# Patient Record
Sex: FEMALE | Race: WHITE
[De-identification: names, ages, dates, MRNs, and addresses within clinical notes are randomized per-mention and may not be internally consistent; named-entity substitution may affect disease eponyms.]

---

## 2018-02-02 ENCOUNTER — HOSPITAL ENCOUNTER (EMERGENCY)
Dept: HOSPITAL 74 - FER | Age: 43
Discharge: HOME | End: 2018-02-02
Payer: COMMERCIAL

## 2018-02-02 VITALS — SYSTOLIC BLOOD PRESSURE: 111 MMHG | TEMPERATURE: 98.6 F | HEART RATE: 60 BPM | DIASTOLIC BLOOD PRESSURE: 75 MMHG

## 2018-02-02 VITALS — BODY MASS INDEX: 30.9 KG/M2

## 2018-02-02 DIAGNOSIS — J40: Primary | ICD-10-CM

## 2018-02-02 PROCEDURE — 3E0F7GC INTRODUCTION OF OTHER THERAPEUTIC SUBSTANCE INTO RESPIRATORY TRACT, VIA NATURAL OR ARTIFICIAL OPENING: ICD-10-PCS

## 2018-02-02 NOTE — PDOC
History of Present Illness





- General


History Source: Patient


Exam Limitations: No Limitations





- History of Present Illness


Initial Comments: 





02/02/18 16:51


The patient is a 42 year old female with a past significant medical history of 

irregular menses (on oral contraceptives, discontinued hormones 6 months ago), 

who presents to the emergency department with frequent coughing and chest 

tightness for several days. She reports a recent diagnosis of asthma from an 

urgent care center. She denies any laboratory or pulmonary function evaluations 

at the time during her urgent care visit. She reports anxiety symptoms in the 

past. She denies any diagnosis for anxiety. 





She denies any fever, chills, shortness of breath. She denies any headache, 

nausea, vomiting, or diarrhea. 


She denies any alcohol use or smoking.


Family history and social history is non contributory. 





<Sharri Cantu - Last Filed: 02/02/18 16:51>





<Osmin Price - Last Filed: 02/02/18 18:06>





- General


Chief Complaint: Asthma


Stated Complaint: COUGH,SOB


Time Seen by Provider: 02/02/18 14:53





Past History





<Sharri Cantu - Last Filed: 02/02/18 16:51>





- Past Medical History


Anemia: No


Asthma: No


Cancer: No


Cardiac Disorders: No


CVA: No


COPD: No


CHF: No


Dementia: No


Diabetes: No


GI Disorders: No


 Disorders: No


HTN: No


Hypercholesterolemia: No


Liver Disease: No


Seizures: No


Thyroid Disease: No





- Suicide/Smoking/Psychosocial Hx


Smoking Status: No


Smoking History: Never smoked


Have you smoked in the past 12 months: No


Number of Cigarettes Smoked Daily: 0


Information on smoking cessation initiated: No


Hx Alcohol Use: Yes (occas)


Drug/Substance Use Hx: No


Substance Use Type: None


Hx Substance Use Treatment: No





<Osmin Price - Last Filed: 02/02/18 18:06>





- Past Medical History


Allergies/Adverse Reactions: 


 Allergies











Allergy/AdvReac Type Severity Reaction Status Date / Time


 


No Known Drug Allergies Allergy Unknown  Verified 02/02/18 14:47











Home Medications: 


Ambulatory Orders





Azithromycin [Zithromax 250mg Tablets -] 250 mg PO DAILY 02/02/18 


Budesonide/Formeterol Fumarate [SYMBICORT 160/4.5mcg -] 1 inh PO BID 02/02/18 


Guaifenesin AC [Robitussin-AC] 1 - 2 tsp PO Q4HWA PRN #120 ml MDD 8 02/02/18 











**Review of Systems





- Review of Systems


Able to Perform ROS?: Yes


Comments:: 





02/02/18 17:01


CONSTITUTIONAL:


Absent: fever, no chills, no fatigue


EYES:


Absent: visual changes


ENT:


Absent: ear pain, no sore throat


CARDIOVASCULAR:


Absent: chest pain, no palpitations


RESPIRATORY:


Present: Chest tightness and coughing


Absent:  no SOB


GI:


Absent: abdominal pain, no nausea, no vomiting, no constipation, no diarrhea


GENITOURINARY:


Absent: dysuria, no frequency, no hematuria


MUSKULOSKELETAL:


Absent: back pain, no arthralgia, no myalgia


SKIN:


Absent: rash


NEURO:


Absent: headache


All Other Systems: Reviewed and Negative





<Sharri Cantu - Last Filed: 02/02/18 16:51>





*Physical Exam





- Vital Signs


 Last Vital Signs











Temp Pulse Resp BP Pulse Ox


 


 98.6 F   60   20   111/75   100 


 


 02/02/18 14:46  02/02/18 14:46  02/02/18 14:46  02/02/18 14:46  02/02/18 14:46














- Physical Exam


Comments: 





02/02/18 16:56


GENERAL: 


Appears somewhat anxious and hyperventillating with no acute respiratory 

distress, oxygen saturation is 100% room air, well-nourished. No apparent 

distress.


HEENT: 


Normocephalic, atraumatic. PERRL, EOM intact.


CARDIOVASCULAR: 


Normal S1, S2. Regular rate and rhythm.


PULMONARY: 


Clear to auscultation bilaterally.


ABDOMEN: 


Soft, non-distended, non-tender. 


EXTREMITIES: 


Normal ROM in all four extremities. No gross deformities.


SKIN: 


Warm, dry.  No rash


NEUROLOGICAL: 


No focal neurological deficits.








<Sharri Cantu - Last Filed: 02/02/18 16:51>





- Vital Signs


 Last Vital Signs











Temp Pulse Resp BP Pulse Ox


 


 98.6 F   60   20   111/75   100 


 


 02/02/18 14:46  02/02/18 14:46  02/02/18 14:46  02/02/18 14:46  02/02/18 14:46














<Osmin Price - Last Filed: 02/02/18 18:06>





ED Treatment Course





- Medications


Given in the ED: 


ED Medications














Discontinued Medications














Generic Name Dose Route Start Last Admin





  Trade Name Penelope  PRN Reason Stop Dose Admin


 


Albuterol/Ipratropium  1 amp  02/02/18 15:34  02/02/18 15:39





  Duoneb -  NEB  02/02/18 15:35  1 amp





  ONCE ONE   Administration


 


Albuterol/Ipratropium  1 amp  02/02/18 16:30  02/02/18 16:30





  Duoneb -  NEB  02/02/18 16:31  1 amp





  NOW ONE   Administration


 


Albuterol/Ipratropium  1 amp  02/02/18 16:33  02/02/18 16:34





  Duoneb -  NEB  02/02/18 16:34  1 amp





  NOW ONE   Administration














<Sharri Cantu - Last Filed: 02/02/18 16:51>





Medical Decision Making





- Medical Decision Making





02/02/18 16:23


Respiratory rate 20 and unlabored, O2 saturation 100% on room air, breath 

sounds clear. The patient appears anxious, breathing deeply and irregularly, 

without apparent respiratory distress,  and symptoms may be predominantly due 

to anxiety.





Nebulizer treatment was undertaken because of a history of asthma, and although 

the patient states she feels better, there is no change in her exam. She is on 

her fifth day of azithromycin and has been prescribed inhaled corticosteroids 

by her primary physician, which she is using via inhaler. She was only 

diagnosed a few weeks ago with asthma by an urgent care center, and the 

diagnosis is therefore questionable.


02/02/18 18:04





Patient's respiratory rate and O2 saturation remains normal. However, she is 

less anxious and is not hyperventilating at present. Cough suppressant is 

prescribed. She is fully ambulatory and in no distress respiratory or otherwise 

at time of discharge to follow-up with her primary care physician. It was 

recommended that she see a lung specialist for further diagnosis and treatment, 

and she is aware that her recent asthma diagnosis is questionable injured 

verified





<Osmin Price - Last Filed: 02/02/18 18:06>





*DC/Admit/Observation/Transfer





- Attestations


Scribe Attestion: 





02/02/18 16:57





Documentation prepared by Sharri Cantu, acting as medical scribe for Osmin Velásquez MD.





<Sharri Cantu - Last Filed: 02/02/18 16:51>





- Discharge Dispostion


Admit: No





<Osmin Price - Last Filed: 02/02/18 18:06>


Diagnosis at time of Disposition: 


 Bronchitis








- Discharge Dispostion


Disposition: HOME


Condition at time of disposition: Stable





- Prescriptions


Prescriptions: 


Guaifenesin AC [Robitussin-AC] 1 - 2 tsp PO Q4HWA PRN #120 ml MDD 8


 PRN Reason: Cough





- Patient Instructions


Printed Discharge Instructions:  DI for Acute Bronchitis


Additional Instructions: 


Continue antibiotics. Use cough medication as directed. Return to ER if there 

is high fever, chest pain, or increased shortness of breath. Otherwise follow-

up with your primary physician in 2-3 days.





- Post Discharge Activity


Forms/Work/School Notes:  Back to Work

## 2018-05-18 ENCOUNTER — HOSPITAL ENCOUNTER (EMERGENCY)
Dept: HOSPITAL 74 - FER | Age: 43
Discharge: HOME | End: 2018-05-18
Payer: COMMERCIAL

## 2018-05-18 VITALS — SYSTOLIC BLOOD PRESSURE: 112 MMHG | DIASTOLIC BLOOD PRESSURE: 74 MMHG | TEMPERATURE: 98.5 F | HEART RATE: 62 BPM

## 2018-05-18 VITALS — BODY MASS INDEX: 31.8 KG/M2

## 2018-05-18 DIAGNOSIS — R07.89: Primary | ICD-10-CM

## 2018-05-18 DIAGNOSIS — E11.9: ICD-10-CM

## 2018-05-18 LAB
ALBUMIN SERPL-MCNC: 4 G/DL (ref 3.5–5)
ALP SERPL-CCNC: 48 U/L (ref 32–92)
ALT SERPL-CCNC: 18 U/L (ref 10–40)
ANION GAP SERPL CALC-SCNC: 7 MMOL/L (ref 8–16)
AST SERPL-CCNC: 18 U/L (ref 10–42)
BASOPHILS # BLD: 0.8 % (ref 0–2)
BILIRUB SERPL-MCNC: < 0.5 MG/DL (ref 0.2–1)
BUN SERPL-MCNC: 11 MG/DL (ref 7–18)
CALCIUM SERPL-MCNC: 9.4 MG/DL (ref 8.4–10.2)
CHLORIDE SERPL-SCNC: 100 MMOL/L (ref 98–107)
CO2 SERPL-SCNC: 28 MMOL/L (ref 22–28)
CREAT SERPL-MCNC: 0.8 MG/DL (ref 0.6–1.3)
DEPRECATED RDW RBC AUTO: 11.6 % (ref 11.6–15.6)
EOSINOPHIL # BLD: 1.5 % (ref 0–4.5)
GLUCOSE SERPL-MCNC: 106 MG/DL (ref 74–106)
HCT VFR BLD CALC: 38.2 % (ref 32.4–45.2)
HGB BLD-MCNC: 13.3 GM/DL (ref 10.7–15.3)
LYMPHOCYTES # BLD: 26.9 % (ref 8–40)
MCH RBC QN AUTO: 30.3 PG (ref 25.7–33.7)
MCHC RBC AUTO-ENTMCNC: 34.8 G/DL (ref 32–36)
MCV RBC: 87.1 FL (ref 80–96)
MONOCYTES # BLD AUTO: 7.1 % (ref 3.8–10.2)
NEUTROPHILS # BLD: 63.7 % (ref 42.8–82.8)
PLATELET # BLD AUTO: 424 K/MM3 (ref 134–434)
PMV BLD: 7.9 FL (ref 7.5–11.1)
POTASSIUM SERPLBLD-SCNC: 3.9 MMOL/L (ref 3.5–5.1)
PROT SERPL-MCNC: 7.4 G/DL (ref 6.4–8.3)
RBC # BLD AUTO: 4.38 M/MM3 (ref 3.6–5.2)
SODIUM SERPL-SCNC: 135 MMOL/L (ref 136–145)
WBC # BLD AUTO: 10.1 K/MM3 (ref 4–10.8)

## 2018-05-18 NOTE — PDOC
*Physical Exam





- Vital Signs


 Last Vital Signs











Temp Pulse Resp BP Pulse Ox


 


 98.5 F   62   16   112/74   100 


 


 05/18/18 17:47  05/18/18 17:47  05/18/18 17:47  05/18/18 17:47  05/18/18 17:47














ED Treatment Course





- LABORATORY


CBC & Chemistry Diagram: 


 05/18/18 18:10





 05/18/18 18:10





- ADDITIONAL ORDERS


Additional order review: 


 Laboratory  Results











  05/18/18 05/18/18 05/18/18





  18:10 18:10 18:02


 


Sodium   135 L 


 


Potassium   3.9 


 


Chloride   100 


 


Carbon Dioxide   28 


 


Anion Gap   7 L 


 


BUN   11 


 


Creat Clearance w eGFR   > 60 


 


Random Glucose   106 


 


Calcium   9.4 


 


AST   18 


 


ALT   18 


 


Alkaline Phosphatase   48 


 


Creatine Kinase   138 


 


Troponin I  < 0.03  


 


Total Protein   7.4 


 


Albumin   4.0 


 


Urine HCG, Qual    Negative








 











  05/18/18





  18:10


 


RBC  4.38


 


MCV  87.1


 


MCHC  34.8


 


RDW  11.6


 


MPV  7.9


 


Neutrophils %  63.7


 


Lymphocytes %  26.9


 


Monocytes %  7.1


 


Eosinophils %  1.5


 


Basophils %  0.8














Progress Note





- Progress Note


Progress Note: 





 Care of this patient was transferred to me from Dr. mujica at 1900 hrs.





This is a 42-year-old female with one-week history of atypical chest pain.





Patient has no other associated symptoms.





Patient's primary risk factor is type 2 diabetes otherwise denies hypertension 

or high cholesterol. Patient also takes oral contraceptives so a d-dimer is 

sent as well.





Patient's EKG is normal





Plan is to follow-up on troponin and d-dimer if normal patient will be 

discharged and told to follow-up with her doctor





D-dimer was not measurable.





Troponin was 335 which is within normal limits.





Patient's symptoms are unchanged and she remains clinically stable patient 

discharged home





*DC/Admit/Observation/Transfer


Diagnosis at time of Disposition: 


 Atypical chest pain








- Discharge Dispostion


Disposition: HOME


Condition at time of disposition: Stable


Decision to Admit order: No





- Referrals


Referrals: 


Janett Diaz [Primary Care Provider] - 





- Patient Instructions


Printed Discharge Instructions:  DI for Atypical Chest Pain


Additional Instructions: 


Your workup was all negative including a normal cardiogram and the blood work.





It is important that you follow-up with your doctor next week.





Return to the emergency department immediately with ANY new, persistent or 

worsening symptoms.





Continue any medications as previously prescribed by your physician.


.


Please make sure your doctor reviews the results of your emergency evaluation.





Thank you for coming to the   Emergency Department today for your care. It was 

a pleasure to see you today. Please note that your evaluation is INCOMPLETE 

until you  follow-up with your doctor. 











- Post Discharge Activity

## 2018-05-18 NOTE — PDOC
History of Present Illness





- General


Chief Complaint: Chest Pain


Stated Complaint: CHEST PAIN


Time Seen by Provider: 18 17:51





- History of Present Illness


Initial Comments: 





18 18:18


"Patient is a 44F, with PMHx of PCOS, pre-diabetic, who presents today with 1 

week of intermittent chest pain. Patient states that about 1 week ago she began 

experiencing occasional pressure-like pain in her chest. The pain is migratory, 

moving from the left side to the right side of her chest. It is not exertional 

and not pleuritic. It typically lasts for approximately 5 mins before resolving 

spontaneously. States the pain is worse with palpation of the area and certain 

movements. She took an aspirin yesterday and states that it helped somewhat. 

She did not take an aspirin today. Pt denies leg swelling, recent travel, or h/

o DVT but is currently on birth control. Pt denies F/C/cough. Denies SOB.





Family Hx: Grandmother ( of heart attack @ 56) Asthma(daughter, sister)


Social Hx: Denies smoking. 








Past History





- Past Medical History


Allergies/Adverse Reactions: 


 Allergies











Allergy/AdvReac Type Severity Reaction Status Date / Time


 


No Known Drug Allergies Allergy Unknown  Verified 18 17:47











Home Medications: 


Ambulatory Orders





Metformin HCl 500 mg PO DAILY 18 


Norethindrone AC-Eth Estradiol [Junel] 1 each PO DAILY 18 


Spironolactone [Aldactone] 25 mg PO DAILY 18 








Anemia: No


Asthma: No


Cancer: No


Cardiac Disorders: No


CVA: No


COPD: No


CHF: No


Dementia: No (PRE DIABETIC)


Diabetes: No


GI Disorders: No


 Disorders: No


HTN: No


Hypercholesterolemia: No


Liver Disease: No


Seizures: No


Thyroid Disease: No


Other medical history: PCOS





- Surgical History


Cholecystectomy: Yes





- Suicide/Smoking/Psychosocial Hx


Smoking Status: No


Smoking History: Never smoked


Have you smoked in the past 12 months: No


Number of Cigarettes Smoked Daily: 0


Hx Alcohol Use: Yes


Drug/Substance Use Hx: No


Substance Use Type: Alcohol


Hx Substance Use Treatment: No





Cardiac Specific PMH





- Complaint Specific PMHX


Pacemaker: No





**Review of Systems





- Review of Systems


Comments:: 





18 18:20


"""GENERAL/CONSTITUTIONAL: No fever or chills. No weakness.


HEAD, EYES, EARS, NOSE AND THROAT: No change in vision. No ear pain or 

discharge. No sore throat.


CARDIOVASCULAR: +chest pain. No shortness of breath.


RESPIRATORY: No cough, wheezing, or hemoptysis.


GASTROINTESTINAL: No nausea, vomiting, diarrhea or constipation.


GENITOURINARY: No dysuria, frequency, or change in urination.


MUSCULOSKELETAL: No joint or muscle swelling or pain. No neck or back pain.


SKIN: No rash


NEUROLOGIC: No headache, vertigo, loss of consciousness, or change in strength/

sensation.


ENDOCRINE: No increased thirst. No abnormal weight change.


HEMATOLOGIC/LYMPHATIC: No anemia, easy bleeding, or history of blood clots.


ALLERGIC/IMMUNOLOGIC: No hives or skin allergy.


"""





*Physical Exam





- Vital Signs


 Last Vital Signs











Temp Pulse Resp BP Pulse Ox


 


 98.5 F   62   16   112/74   100 


 


 18 17:47  18 17:47  18 17:47  18 17:47  18 17:47














- Physical Exam


Comments: 





18 18:21


"GENERAL: Awake, alert, and fully oriented, in no acute distress.


HEAD: No signs of trauma


EYES: PERRLA, EOMI, sclera anicteric, conjunctiva clear


ENT: Auricles normal inspection, hearing grossly normal, nares patent, 

oropharynx clear without exudates. Moist mucosa


NECK: Nontender, no stepoffs, Normal ROM, supple, no lymphadenopathy, JVD, or 

masses


LUNGS: Breath sounds equal, clear to auscultation bilaterally.  No wheezes, and 

no crackles


HEART: Regular rate and rhythm, normal S1 and S2, no murmurs, rubs or gallops


CHEST: + anterior chest wall tender to palpation


ABDOMEN: Soft, nontender, normoactive bowel sounds.  No guarding, no rebound.  

No masses


EXTREMITIES: Normal range of motion, no edema.  No clubbing or cyanosis. No 

cords, erythema, or tenderness


NEUROLOGICAL: Cranial nerves II through XII intact. 5/5 strength and sensation 

in all extremities, Normal speech, normal gait, normal cerebellar function


SKIN: Warm, Dry, normal turgor, no rashes or lesions noted.


"





**Heart Score/ECG Review





- History


History: Slightly suspicious





- Electrocardiogram


EKG: Normal





- Age


Age: </= 45





- Risk Factors


Risk Factors Heart Score: Yes Hx Hypercholesterolemia, Yes Hx Diabetes, Yes 

Positive family hx of cardiac disease


Based on the list above the patient has:: >/=3 risk factors or Hx 

atherosclerotic disease





- Troponin


Troponin: </= normal limit





- Score


Heart Score - Total: 2





- ECG Impressions


Comment:: 





18 18:22


NSR, no JOANNA/STDs, no TWIs, axis wnl, intervals wnl, rate 65





Moderate Sedation





- Procedure Monitoring


Vital Signs: 


 Vital Signs











Temp Pulse Resp BP Pulse Ox


 


 98.5 F   62   16   112/74   100 


 


 18 17:47  18 17:47  18 17:47  18 17:47  18 17:47














ED Treatment Course





- LABORATORY


CBC & Chemistry Diagram: 


 18 18:10





 18 18:10





- ADDITIONAL ORDERS


Additional order review: 


 











  18





  18:10


 


RBC  4.38


 


MCV  87.1


 


MCHC  34.8


 


RDW  11.6


 


MPV  7.9


 


Neutrophils %  63.7


 


Lymphocytes %  26.9


 


Monocytes %  7.1


 


Eosinophils %  1.5


 


Basophils %  0.8














- RADIOLOGY


Radiology Studies Ordered: 














 Category Date Time Status


 


 CHEST PA & LAT [RAD] Stat Radiology  18 18:01 Completed














Medical Decision Making





- Medical Decision Making





18 18:22


42 F with atypical chest pain. ACS very unlikely as pt with normal EKG. PE is 

unlikely as pt with normal vitals, no clinical signs of DVT. However, given 

that pt is on OCPs, will d-dimer to r/o PE. Pain more likely msk in etiology 

given reproducibility with palpation and movement.


- Labs, trop, ddimer


- CXR


- Tylenol





Pt signed out to oncoming attending at 7PM, pending labs, XR, and re-evaluation.





*DC/Admit/Observation/Transfer


Diagnosis at time of Disposition: 


 Atypical chest pain








- Discharge Dispostion


Disposition: HOME


Condition at time of disposition: Stable





- Referrals


Referrals: 


Janett Diaz [Primary Care Provider] - 





- Patient Instructions


Printed Discharge Instructions:  DI for Atypical Chest Pain


Additional Instructions: 


Your workup was all negative including a normal cardiogram and the blood work.





It is important that you follow-up with your doctor next week.





Return to the emergency department immediately with ANY new, persistent or 

worsening symptoms.





Continue any medications as previously prescribed by your physician.


.


Please make sure your doctor reviews the results of your emergency evaluation.





Thank you for coming to the   Emergency Department today for your care. It was 

a pleasure to see you today. Please note that your evaluation is INCOMPLETE 

until you  follow-up with your doctor. 











- Post Discharge Activity





- Attestations


Physician Attestion: 





18 23:54








I, Dr. Jose G Green MD, attest that this document has been prepared under my 

direction and personally reviewed by me in its entirety.   I further attest, 

that it accurately reflects all work, treatment, procedures and medical decision

-making performed by me.

## 2018-05-22 NOTE — EKG
Test Reason : 

Blood Pressure : ***/*** mmHG

Vent. Rate : 065 BPM     Atrial Rate : 065 BPM

   P-R Int : 120 ms          QRS Dur : 074 ms

    QT Int : 400 ms       P-R-T Axes : 046 026 042 degrees

   QTc Int : 416 ms

 

NORMAL SINUS RHYTHM

NORMAL ECG

NO PREVIOUS ECGS AVAILABLE

Confirmed by MAHENDRA WESLEY MD ([id number]) on 5/22/2018 12:57:11 AM

 

Referred By: MD MCCORD           Confirmed By:MAHENDRA WESLEY MD

## 2023-03-02 ENCOUNTER — HOSPITAL ENCOUNTER (EMERGENCY)
Dept: HOSPITAL 74 - JER | Age: 48
Discharge: HOME | End: 2023-03-02
Payer: COMMERCIAL

## 2023-03-02 VITALS — BODY MASS INDEX: 27.4 KG/M2

## 2023-03-02 VITALS — DIASTOLIC BLOOD PRESSURE: 57 MMHG | RESPIRATION RATE: 18 BRPM | HEART RATE: 72 BPM | SYSTOLIC BLOOD PRESSURE: 115 MMHG

## 2023-03-02 VITALS — TEMPERATURE: 97.9 F

## 2023-03-02 DIAGNOSIS — R19.7: ICD-10-CM

## 2023-03-02 DIAGNOSIS — R10.13: Primary | ICD-10-CM

## 2023-03-02 LAB
ALBUMIN SERPL-MCNC: 4.3 G/DL (ref 3.4–5)
ALP SERPL-CCNC: 71 U/L (ref 45–117)
ALT SERPL-CCNC: 49 U/L (ref 13–61)
ANION GAP SERPL CALC-SCNC: 7 MMOL/L (ref 8–16)
AST SERPL-CCNC: 65 U/L (ref 15–37)
BASOPHILS # BLD: 0.6 % (ref 0–2)
BILIRUB SERPL-MCNC: 0.5 MG/DL (ref 0.2–1)
BUN SERPL-MCNC: 12.7 MG/DL (ref 7–18)
CALCIUM SERPL-MCNC: 9.5 MG/DL (ref 8.5–10.1)
CHLORIDE SERPL-SCNC: 110 MMOL/L (ref 98–107)
CO2 SERPL-SCNC: 24 MMOL/L (ref 21–32)
CREAT SERPL-MCNC: 0.9 MG/DL (ref 0.55–1.3)
DEPRECATED RDW RBC AUTO: 13.1 % (ref 11.6–15.6)
EOSINOPHIL # BLD: 0.6 % (ref 0–4.5)
GLUCOSE SERPL-MCNC: 87 MG/DL (ref 74–106)
HCT VFR BLD CALC: 39.1 % (ref 32.4–45.2)
HGB BLD-MCNC: 13.2 GM/DL (ref 10.7–15.3)
LIPASE SERPL-CCNC: 96 U/L (ref 73–393)
LYMPHOCYTES # BLD: 11.9 % (ref 8–40)
MCH RBC QN AUTO: 30.2 PG (ref 25.7–33.7)
MCHC RBC AUTO-ENTMCNC: 33.8 G/DL (ref 32–36)
MCV RBC: 89.1 FL (ref 80–96)
MONOCYTES # BLD AUTO: 6.7 % (ref 3.8–10.2)
NEUTROPHILS # BLD: 80.2 % (ref 42.8–82.8)
PLATELET # BLD AUTO: 433 10^3/UL (ref 134–434)
PMV BLD: 7.9 FL (ref 7.5–11.1)
PROT SERPL-MCNC: 7.7 G/DL (ref 6.4–8.2)
RBC # BLD AUTO: 4.39 M/MM3 (ref 3.6–5.2)
SODIUM SERPL-SCNC: 141 MMOL/L (ref 136–145)
WBC # BLD AUTO: 14.7 K/MM3 (ref 4–10)

## 2023-03-02 PROCEDURE — 3E0333Z INTRODUCTION OF ANTI-INFLAMMATORY INTO PERIPHERAL VEIN, PERCUTANEOUS APPROACH: ICD-10-PCS

## 2023-03-02 PROCEDURE — 3E033GC INTRODUCTION OF OTHER THERAPEUTIC SUBSTANCE INTO PERIPHERAL VEIN, PERCUTANEOUS APPROACH: ICD-10-PCS
